# Patient Record
Sex: MALE | Race: OTHER | NOT HISPANIC OR LATINO
[De-identification: names, ages, dates, MRNs, and addresses within clinical notes are randomized per-mention and may not be internally consistent; named-entity substitution may affect disease eponyms.]

---

## 2024-03-21 ENCOUNTER — NON-APPOINTMENT (OUTPATIENT)
Age: 24
End: 2024-03-21

## 2024-03-26 ENCOUNTER — APPOINTMENT (OUTPATIENT)
Dept: ORTHOPEDIC SURGERY | Facility: CLINIC | Age: 24
End: 2024-03-26
Payer: COMMERCIAL

## 2024-03-26 VITALS — BODY MASS INDEX: 26.6 KG/M2 | WEIGHT: 190 LBS | HEIGHT: 71 IN | RESPIRATION RATE: 14 BRPM

## 2024-03-26 DIAGNOSIS — Z87.898 PERSONAL HISTORY OF OTHER SPECIFIED CONDITIONS: ICD-10-CM

## 2024-03-26 DIAGNOSIS — F12.91 CANNABIS USE, UNSPECIFIED, IN REMISSION: ICD-10-CM

## 2024-03-26 PROBLEM — Z00.00 ENCOUNTER FOR PREVENTIVE HEALTH EXAMINATION: Status: ACTIVE | Noted: 2024-03-26

## 2024-03-26 PROCEDURE — 76882 US LMTD JT/FCL EVL NVASC XTR: CPT

## 2024-03-26 PROCEDURE — 99203 OFFICE O/P NEW LOW 30 MIN: CPT

## 2024-03-26 PROCEDURE — 73130 X-RAY EXAM OF HAND: CPT | Mod: RT

## 2024-03-26 RX ORDER — LITHIUM CARBONATE 600 MG/1
600 CAPSULE ORAL
Refills: 0 | Status: ACTIVE | COMMUNITY

## 2024-03-26 NOTE — ASSESSMENT
[FreeTextEntry1] : Laceration of the right fourth FDP and ulnar digital nerve as well as right fifth FDP and ulnar digital nerve  Options were discussed ranging conservative management to surgical intervention.  Risk associated with each option discussed including the risk of bleeding infection nerve and tendon issues stiffness pain syndrome rerupture extensive therapy need for further treatment and surgery and other issues associated with both conservative management and surgical intervention.  Patient appeared to understand these risks and all questions answered.  He would like to proceed with right fourth and fifth digit exploration with zone 2 FDP repair of 4 and fifth digit and microscopic repair of ulnar digital nerve of the fourth and fifth digits and repair of any other structures found injured

## 2024-03-26 NOTE — HISTORY OF PRESENT ILLNESS
[Right] : right hand dominant [FreeTextEntry1] : Date injury: March 22, 2024  Patient here 4 days status post right fourth and fifth digital lacerations with a pocket knife.  Patient is up-to-date with his tetanus shot.  Patient had stitches placed at urgent care.  Patient has numbness on the ulnar side of his fifth finger and limited range of motion of that finger.

## 2024-03-26 NOTE — PHYSICAL EXAM
[de-identified] : There is lacerations on the ulnar aspect of the right fourth and fifth digits just proximal to the PIP joint with a break in cascade of the fourth and fifth and compared to the second and third.  The FDS to the fourth and fifth appears to be intact but there is no function appreciated in the fourth and fifth FDP.  There is a positive Tinel's at the ulnar digital nerve of the fourth and fifth digits but the radial digital nerve has no sensitivity  Sensation is 5 mm on the radial aspect of the fourth digit and fifth digit and 9 mm on the ulnar side of the fourth digit and greater than 15 on the ulnar side of the fifth digit. [de-identified] : PA lateral and oblique of the right hand shows hardware in the metacarpal with solid union but no evidence of fractures or dislocations and no foreign bodies visualized.  PA of both hands shows joint spaces symmetric bilaterally without evidence of arthritis

## 2024-03-27 ENCOUNTER — APPOINTMENT (OUTPATIENT)
Dept: ORTHOPEDIC SURGERY | Facility: CLINIC | Age: 24
End: 2024-03-27
Payer: COMMERCIAL

## 2024-03-27 PROCEDURE — 99442: CPT

## 2024-03-29 ENCOUNTER — TRANSCRIPTION ENCOUNTER (OUTPATIENT)
Age: 24
End: 2024-03-29

## 2024-03-29 NOTE — ASU DISCHARGE PLAN (ADULT/PEDIATRIC) - ASU DC SPECIAL INSTRUCTIONSFT
Co-Directors: Wali Snider MD; Pollo Arteaga MD; Gerson Lan MD   [facility] [address] 	  [place] [address] 	 	  Phone [phone] (HAND), Fax [phone]    [email or web address]    Hand Surgery Post Operative Instructions      DRESSING CARE:  1.	Please keep bandage ON and DRY until you return to the office for your 1st postoperative visit.   2.	In the shower you must cover bandage with a plastic bag. You can use tape or a rubber band so no water leaks into the bag.   3.	Please do not exercise as that leads to excessive sweating as the bandage will therefore become moist/ wet.    4.	Do not remove or change your bandage. You may apply more tape if dressing starts to unravel.   5.	Please do not insert any objects, such as a pencil, down into the bandage.     ELEVATION:  1.	Keep hand/wrist above heart level at all times or until bandage feels loose. This will help with swelling of the fingers and can be accomplished by using the FOAM PILLOW.   2.	 A sling will not hold your hand/wrist above your heart and therefore its use should be limited (it may also cause shoulder stiffness).     ACTIVITY:  1.	Moving your fingers daily after surgery is very important to prevent stiffness. Please open your fingers completely and close your fingers completely to achieve full range of motion.  **UNLESS TENDON REPAIR OR NERVE REPAIR SURGERY PERFORMED    2.	Move all joints of the extremity that are not immobilized to prevent stiffness (i.e. shoulder, elbow, fingers, and thumb unless instructed otherwise).   3.	Avoid activities which may re-injure your hand or finger.     DIET:   Regular diet. Start light and progress as tolerated.     PAIN MEDICINE:   1.	Pain medicine was sent to your pharmacy.  2.	Take pain medicine on an “AS NEEDED” basis according to your doctor’s instructions.   3.	Your pain will decrease over the next few days allowing you to:   •	Decrease your pain medicine quantity until you stop.   •	Increase the time between doses until you stop.   4.	You should not drink alcoholic beverages while on pain medication.   5.	Take pain medicine with food to prevent nausea.   6.	Constipation can occur. If no bowel movement occurs within 48 hours take a laxative of your choice (over the counter).	 	      CONTACT PHYSICIAN FOR:   Slight pain, swelling and bluish discoloration are to be expected. If you have breathing difficulty or chest pain dial 911 immediately. However, if the following symptoms occur notify your physician:   •	Temperature above 101° F 	        • Inability to urinate in 8 hours   •	Uncontrolled nausea/vomiting   	• Progressively increasing pain   •	Signs of wound infection 	                • Excessive bleeding  (Redness, swelling, pus-like drainage)     • Increasing numbness   •	Excessive swelling and tightness 	• Splint or cast that is too tight      OFFICE APPOINTMENT:    A staff member from the office will call you in the next 1-2 days to schedule your 1st Post Operative appointment to see your physician back in the office. *IF someone does not reach out to you in the next 1-2 days please call the office.      Patient Name _________________________________ Signature ______________________________ Date__________    Witness _____________________________________________________ Date ______________

## 2024-03-29 NOTE — ASU DISCHARGE PLAN (ADULT/PEDIATRIC) - NS MD DC FALL RISK RISK
For information on Fall & Injury Prevention, visit: [email or web address] OR  [email or web address] OR  https://www.cdc.gov/steadi/patient.html

## 2024-03-29 NOTE — BRIEF OPERATIVE NOTE - NSICDXBRIEFPROCEDURE_GEN_ALL_CORE_FT
PROCEDURES:  Ulnar digital nerve repair 29-Mar-2024 08:43:52  Dakota Frazier  Ulnar digital nerve repair 29-Mar-2024 08:44:06  Dakota Frazier  Repair, tendon and nerve, finger 29-Mar-2024 08:44:32  Dakota Frazier

## 2024-03-29 NOTE — BRIEF OPERATIVE NOTE - NSICDXBRIEFPREOP_GEN_ALL_CORE_FT
PRE-OP DIAGNOSIS:  Digital nerve laceration, finger, initial encounter 29-Mar-2024 08:45:13  Dakota Frazier  Laceration of right hand with tendon involvement including fingers, initial encounter 29-Mar-2024 08:45:41 Right IV and V FDP repair and ulna digital nerve repairs Dakota Frazier

## 2024-03-29 NOTE — BRIEF OPERATIVE NOTE - NSICDXBRIEFPOSTOP_GEN_ALL_CORE_FT
POST-OP DIAGNOSIS:  Digital nerve laceration, finger, initial encounter 29-Mar-2024 08:46:08  Dakota Frazier  Laceration of right hand with tendon involvement including fingers 29-Mar-2024 08:46:37  Dakota Frazier

## 2024-03-29 NOTE — ASU DISCHARGE PLAN (ADULT/PEDIATRIC) - CARE PROVIDER_API CALL
Wali Snider  Sur[facility]  [address]  [place] [address]  Phone: [phone]  Fax: [phone]  Established Patient  Follow Up Time:

## 2024-04-01 ENCOUNTER — TRANSCRIPTION ENCOUNTER (OUTPATIENT)
Age: 24
End: 2024-04-01

## 2024-04-01 RX ORDER — LITHIUM CARBONATE 300 MG/1
1 TABLET, EXTENDED RELEASE ORAL
Refills: 0 | DISCHARGE

## 2024-04-01 NOTE — ASU PATIENT PROFILE, ADULT - FALL HARM RISK - UNIVERSAL INTERVENTIONS
Bed in lowest position, wheels locked, appropriate side rails in place/Call bell, personal items and telephone in reach/Instruct patient to call for assistance before getting out of bed or chair/Non-slip footwear when patient is out of bed/[place] to call system/Physically safe environment - no spills, clutter or unnecessary equipment/Purposeful Proactive Rounding/Room/bathroom lighting operational, light cord in reach

## 2024-04-01 NOTE — ASU PATIENT PROFILE, ADULT - NS PREOP UNDERSTANDS INFO
No solid food for 8 hours before procedure, no chewing gums or hard candy while NPO/ no lotions/ perfume/ jewelry or make up/yes

## 2024-04-01 NOTE — ASU PATIENT PROFILE, ADULT - REASON FOR ADMISSION, PROFILE
RIGHT FOURTH AND FIFTH DIGIT EXPLORATION WITH ZONE 2 FDP REPAIR OF 4 AND FIFTH DIGIT AND MICROSCOPIC REPAIR OF ULNAR DIGITAL NERVE OF THE FOURTH AND FIFTH DIGITS AND REPAIR OF ANY OTHER STRUCTURES FOUND INJURED

## 2024-04-02 ENCOUNTER — APPOINTMENT (OUTPATIENT)
Dept: ORTHOPEDIC SURGERY | Facility: AMBULATORY SURGERY CENTER | Age: 24
End: 2024-04-02
Payer: COMMERCIAL

## 2024-04-02 ENCOUNTER — OUTPATIENT (OUTPATIENT)
Dept: OUTPATIENT SERVICES | Facility: HOSPITAL | Age: 24
LOS: 1 days | Discharge: HOME | End: 2024-04-02

## 2024-04-02 VITALS
HEART RATE: 60 BPM | RESPIRATION RATE: 16 BRPM | SYSTOLIC BLOOD PRESSURE: 124 MMHG | OXYGEN SATURATION: 98 % | DIASTOLIC BLOOD PRESSURE: 72 MMHG | TEMPERATURE: 98 F

## 2024-04-02 VITALS
HEART RATE: 89 BPM | SYSTOLIC BLOOD PRESSURE: 129 MMHG | WEIGHT: 196.65 LBS | HEIGHT: 71 IN | DIASTOLIC BLOOD PRESSURE: 80 MMHG | OXYGEN SATURATION: 97 % | RESPIRATION RATE: 16 BRPM | TEMPERATURE: 98 F

## 2024-04-02 DIAGNOSIS — Z98.890 OTHER SPECIFIED POSTPROCEDURAL STATES: Chronic | ICD-10-CM

## 2024-04-02 PROCEDURE — 26356 REPAIR FINGER/HAND TENDON: CPT | Mod: 59,F8

## 2024-04-02 PROCEDURE — 64831 REPAIR OF DIGIT NERVE: CPT | Mod: F8

## 2024-04-02 PROCEDURE — 64832 REPAIR NERVE ADD-ON: CPT | Mod: F9

## 2024-04-02 RX ORDER — FENTANYL CITRATE 50 UG/ML
25 INJECTION INTRAVENOUS
Refills: 0 | Status: DISCONTINUED | OUTPATIENT
Start: 2024-04-02 | End: 2024-04-02

## 2024-04-02 RX ORDER — OXYCODONE AND ACETAMINOPHEN 5; 325 MG/1; MG/1
5-325 TABLET ORAL
Qty: 15 | Refills: 0 | Status: ACTIVE | COMMUNITY
Start: 2024-04-02 | End: 1900-01-01

## 2024-04-02 RX ORDER — CEPHALEXIN 500 MG/1
500 CAPSULE ORAL 3 TIMES DAILY
Qty: 21 | Refills: 0 | Status: ACTIVE | COMMUNITY
Start: 2024-04-02 | End: 1900-01-01

## 2024-04-02 RX ORDER — SODIUM CHLORIDE 9 MG/ML
500 INJECTION, SOLUTION INTRAVENOUS
Refills: 0 | Status: DISCONTINUED | OUTPATIENT
Start: 2024-04-02 | End: 2024-04-02

## 2024-04-02 RX ORDER — OXYCODONE HYDROCHLORIDE 5 MG/1
5 TABLET ORAL ONCE
Refills: 0 | Status: DISCONTINUED | OUTPATIENT
Start: 2024-04-02 | End: 2024-04-02

## 2024-04-02 RX ORDER — ONDANSETRON 8 MG/1
4 TABLET, FILM COATED ORAL ONCE
Refills: 0 | Status: DISCONTINUED | OUTPATIENT
Start: 2024-04-02 | End: 2024-04-02

## 2024-04-02 RX ORDER — OXYCODONE HYDROCHLORIDE 5 MG/1
10 TABLET ORAL ONCE
Refills: 0 | Status: DISCONTINUED | OUTPATIENT
Start: 2024-04-02 | End: 2024-04-02

## 2024-04-02 RX ADMIN — SODIUM CHLORIDE 100 MILLILITER(S): 9 INJECTION, SOLUTION INTRAVENOUS at 15:13

## 2024-04-02 NOTE — PRE-ANESTHESIA EVALUATION ADULT - NSANTHOSAYNRD_GEN_A_CORE
No. ILIANA screening performed.  STOP BANG Legend: 0-2 = LOW Risk; 3-4 = INTERMEDIATE Risk; 5-8 = HIGH Risk

## 2024-04-05 ENCOUNTER — APPOINTMENT (OUTPATIENT)
Dept: ORTHOPEDIC SURGERY | Facility: CLINIC | Age: 24
End: 2024-04-05
Payer: COMMERCIAL

## 2024-04-05 PROCEDURE — 99024 POSTOP FOLLOW-UP VISIT: CPT

## 2024-04-05 NOTE — HISTORY OF PRESENT ILLNESS
[___ Days Post Op] : post op day #[unfilled] [de-identified] : DOS: 4/2/24 [de-identified] : 3 days status post right fourth and fifth digit exploration with zone 2 FDP repair of 4 and fifth digit and microscopic repair of ulnar digital nerve of the fourth and fifth digits [de-identified] : Incision line is clean and dry sutures are intact good capillary refill sensation unchanged [de-identified] : 3 days status post right fourth and fifth digit exploration with zone 2 FDP repair of 4 and fifth digit and microscopic repair of ulnar digital nerve of the fourth and fifth digits [de-identified] : Dressings were changed and he was placed into a dorsal hooded splint and referred out to therapy for flexor tendon repair protocol.  Return to the office in 1-1/2 weeks

## 2024-04-15 ENCOUNTER — APPOINTMENT (OUTPATIENT)
Dept: ORTHOPEDIC SURGERY | Facility: CLINIC | Age: 24
End: 2024-04-15
Payer: COMMERCIAL

## 2024-04-15 DIAGNOSIS — S64.40XD INJURY OF DIGITAL NERVE OF UNSPECIFIED FINGER, SUBSEQUENT ENCOUNTER: ICD-10-CM

## 2024-04-15 DIAGNOSIS — S61.209D LACERATION OF FLEXOR MUSCLE, FASCIA AND TENDON OF UNSPECIFIED FINGER AT FOREARM LEVEL, SUBSEQUENT ENCOUNTER: ICD-10-CM

## 2024-04-15 DIAGNOSIS — S56.129D LACERATION OF FLEXOR MUSCLE, FASCIA AND TENDON OF UNSPECIFIED FINGER AT FOREARM LEVEL, SUBSEQUENT ENCOUNTER: ICD-10-CM

## 2024-04-15 PROBLEM — Z86.59 PERSONAL HISTORY OF OTHER MENTAL AND BEHAVIORAL DISORDERS: Chronic | Status: ACTIVE | Noted: 2024-04-01

## 2024-04-15 PROCEDURE — 99024 POSTOP FOLLOW-UP VISIT: CPT

## 2024-04-15 NOTE — HISTORY OF PRESENT ILLNESS
[___ Days Post Op] : post op day #[unfilled] [de-identified] : DOS: 4/2/24 [de-identified] : 13 days s/p right fourth and fifth digit exploration with zone 2 FDP repair of 4 and fifth digit and microscopic repair of ulnar digital nerve of the fourth and fifth digits.   [de-identified] : The incision line is well-healed and there is a good cascade.  Passively the digits are moving well with good capillary refill and sensation unchanged [de-identified] : 13 days s/p right fourth and fifth digit exploration with zone 2 FDP repair of 4 and fifth digit and microscopic repair of ulnar digital nerve of the fourth and fifth digits.   [de-identified] : Sutures were removed and patient will continue with outside therapy and flexor tendon repair protocol.  Return to the office in 3 weeks

## (undated) DEVICE — DRSG KERLIX ROLL 4.5"

## (undated) DEVICE — TOURNIQUET CUFF 24" DUAL PORT SINGLE BLADDER W PLC (BLACK)

## (undated) DEVICE — SUT ETHILON 8-0 5" BV130-5

## (undated) DEVICE — TONGUE DEPRESSOR

## (undated) DEVICE — TOURNIQUET CUFF 18" DUAL PORT SINGLE BLADDER W PLC  (BLACK)

## (undated) DEVICE — SUT VICRYL 4-0 18" PS-2 UNDYED

## (undated) DEVICE — PACK HAND

## (undated) DEVICE — GLV 8 PROTEXIS (WHITE)

## (undated) DEVICE — VENODYNE/SCD SLEEVE CALF MEDIUM

## (undated) DEVICE — MERCIAN VISABILITY BACKROUND YELLOW

## (undated) DEVICE — BLADE SCALPEL SAFETY #11 WITH PLASTIC GREEN HANDLE

## (undated) DEVICE — NDL HYPO SAFE 25G X 5/8" (ORANGE)

## (undated) DEVICE — SUT ETHILON 5-0 18" P-3

## (undated) DEVICE — DRAPE TOWEL BLUE 17" X 24"

## (undated) DEVICE — IMMOBILIZER HAND ALUMINUM LARGE

## (undated) DEVICE — WARMING BLANKET LOWER ADULT

## (undated) DEVICE — SUT ETHILON 9-0 5" BV100-4

## (undated) DEVICE — SPEAR CELLULOSE 40410

## (undated) DEVICE — MARKING PEN W RULER

## (undated) DEVICE — BLADE SCALPEL SAFETY #15 WITH PLASTIC GREEN HANDLE

## (undated) DEVICE — DRAPE MICROSCOPE ZEISS